# Patient Record
Sex: FEMALE | Race: BLACK OR AFRICAN AMERICAN | Employment: FULL TIME | URBAN - METROPOLITAN AREA
[De-identification: names, ages, dates, MRNs, and addresses within clinical notes are randomized per-mention and may not be internally consistent; named-entity substitution may affect disease eponyms.]

---

## 2017-04-28 ENCOUNTER — HOSPITAL ENCOUNTER (EMERGENCY)
Age: 27
Discharge: HOME | End: 2017-04-28
Attending: EMERGENCY MEDICINE
Payer: SELF-PAY

## 2017-04-28 ENCOUNTER — APPOINTMENT (OUTPATIENT)
Dept: ULTRASOUND IMAGING | Age: 27
End: 2017-04-28
Attending: PHYSICIAN ASSISTANT
Payer: SELF-PAY

## 2017-04-28 VITALS
RESPIRATION RATE: 16 BRPM | TEMPERATURE: 98 F | WEIGHT: 220 LBS | SYSTOLIC BLOOD PRESSURE: 120 MMHG | OXYGEN SATURATION: 99 % | HEART RATE: 78 BPM | DIASTOLIC BLOOD PRESSURE: 74 MMHG

## 2017-04-28 DIAGNOSIS — O20.0 THREATENED ABORTION: Primary | ICD-10-CM

## 2017-04-28 LAB
ABO + RH BLD: NORMAL
APPEARANCE UR: ABNORMAL
BACTERIA URNS QL MICRO: NEGATIVE /HPF
BASOPHILS # BLD AUTO: 0 K/UL (ref 0–0.1)
BASOPHILS # BLD: 0 % (ref 0–1)
BILIRUB UR QL: NEGATIVE
BLOOD BANK CMNT PATIENT-IMP: NORMAL
CLUE CELLS VAG QL WET PREP: NORMAL
COLOR UR: ABNORMAL
EOSINOPHIL # BLD: 0.1 K/UL (ref 0–0.4)
EOSINOPHIL NFR BLD: 1 % (ref 0–7)
EPITH CASTS URNS QL MICRO: ABNORMAL /LPF
ERYTHROCYTE [DISTWIDTH] IN BLOOD BY AUTOMATED COUNT: 14.6 % (ref 11.5–14.5)
GLUCOSE UR STRIP.AUTO-MCNC: NEGATIVE MG/DL
HCG SERPL-ACNC: ABNORMAL MIU/ML (ref 0–6)
HCT VFR BLD AUTO: 34.2 % (ref 35–47)
HGB BLD-MCNC: 10.9 G/DL (ref 11.5–16)
HGB UR QL STRIP: NEGATIVE
HYALINE CASTS URNS QL MICRO: ABNORMAL /LPF (ref 0–5)
KETONES UR QL STRIP.AUTO: NEGATIVE MG/DL
KOH PREP SPEC: NORMAL
LEUKOCYTE ESTERASE UR QL STRIP.AUTO: ABNORMAL
LYMPHOCYTES # BLD AUTO: 16 % (ref 12–49)
LYMPHOCYTES # BLD: 2.1 K/UL (ref 0.8–3.5)
MCH RBC QN AUTO: 25.1 PG (ref 26–34)
MCHC RBC AUTO-ENTMCNC: 31.9 G/DL (ref 30–36.5)
MCV RBC AUTO: 78.6 FL (ref 80–99)
MONOCYTES # BLD: 0.8 K/UL (ref 0–1)
MONOCYTES NFR BLD AUTO: 6 % (ref 5–13)
MUCOUS THREADS URNS QL MICRO: ABNORMAL /LPF
NEUTS SEG # BLD: 10.3 K/UL (ref 1.8–8)
NEUTS SEG NFR BLD AUTO: 77 % (ref 32–75)
NITRITE UR QL STRIP.AUTO: NEGATIVE
PH UR STRIP: 6 [PH] (ref 5–8)
PLATELET # BLD AUTO: 322 K/UL (ref 150–400)
PROT UR STRIP-MCNC: ABNORMAL MG/DL
RBC # BLD AUTO: 4.35 M/UL (ref 3.8–5.2)
RBC #/AREA URNS HPF: ABNORMAL /HPF (ref 0–5)
SERVICE CMNT-IMP: NORMAL
SP GR UR REFRACTOMETRY: >1.03 (ref 1–1.03)
T VAGINALIS VAG QL WET PREP: NORMAL
UA: UC IF INDICATED,UAUC: ABNORMAL
UROBILINOGEN UR QL STRIP.AUTO: 1 EU/DL (ref 0.2–1)
WBC # BLD AUTO: 13.4 K/UL (ref 3.6–11)
WBC URNS QL MICRO: ABNORMAL /HPF (ref 0–4)

## 2017-04-28 PROCEDURE — 86900 BLOOD TYPING SEROLOGIC ABO: CPT | Performed by: PHYSICIAN ASSISTANT

## 2017-04-28 PROCEDURE — 87210 SMEAR WET MOUNT SALINE/INK: CPT | Performed by: PHYSICIAN ASSISTANT

## 2017-04-28 PROCEDURE — 85025 COMPLETE CBC W/AUTO DIFF WBC: CPT | Performed by: PHYSICIAN ASSISTANT

## 2017-04-28 PROCEDURE — 87491 CHLMYD TRACH DNA AMP PROBE: CPT | Performed by: PHYSICIAN ASSISTANT

## 2017-04-28 PROCEDURE — 76801 OB US < 14 WKS SINGLE FETUS: CPT

## 2017-04-28 PROCEDURE — 36415 COLL VENOUS BLD VENIPUNCTURE: CPT | Performed by: PHYSICIAN ASSISTANT

## 2017-04-28 PROCEDURE — 99284 EMERGENCY DEPT VISIT MOD MDM: CPT

## 2017-04-28 PROCEDURE — 84702 CHORIONIC GONADOTROPIN TEST: CPT | Performed by: PHYSICIAN ASSISTANT

## 2017-04-28 PROCEDURE — 87086 URINE CULTURE/COLONY COUNT: CPT | Performed by: EMERGENCY MEDICINE

## 2017-04-28 PROCEDURE — 81001 URINALYSIS AUTO W/SCOPE: CPT | Performed by: PHYSICIAN ASSISTANT

## 2017-04-28 PROCEDURE — 76817 TRANSVAGINAL US OBSTETRIC: CPT

## 2017-04-28 RX ORDER — METRONIDAZOLE 500 MG/1
500 TABLET ORAL 2 TIMES DAILY
Qty: 14 TAB | Refills: 0 | Status: SHIPPED | OUTPATIENT
Start: 2017-04-28 | End: 2017-05-05

## 2017-04-28 NOTE — LETTER
[address] 
[address] [facility] 7 [id number] 
[phone] Work/School Note Date: 4/28/2017 To Whom It May concern: 
 
Galo Mcmullen was seen and treated today in the emergency room by the following provider(s): 
Attending Provider: Trisha Cote MD 
Physician Assistant: Af[place], [place]. Galo Mcmullen may return to work on 5/1/17. Sincerely, Bandar Eagle RN

## 2017-04-29 NOTE — ED PROVIDER NOTES
HPI Comments: 24 y.o. female with past medical history significant for c-sections who presents from home with chief complaint of vaginal bleeding. Pt complains of vaginal bleeding that started earlier this morning and intermittent cramping pelvic pain. Pt notes that she is eight weeks pregnant and has been vomiting daily (3x today). Pt also complains of dizziness and lightheadedness when she is vomiting. Pt states that she has had two previous pregnancies without any complications. Pt also complains of constipation. Pt states that she had cold-like symptoms earlier that went away a week ago. Pt denies any chest pain, SOB or dysuria. There are no other acute medical concerns at this time. Note written by Fortunato Nix, as dictated by [facility] 9:45 PM          The history is provided by the patient. No  was used. History reviewed. No pertinent past medical history. Past Surgical History:   Procedure Laterality Date    HX  SECTION      x 2         History reviewed. No pertinent family history. Social History     Social History    Marital status: SINGLE     Spouse name: N/A    Number of children: N/A    Years of education: N/A     Occupational History    Not on file. Social History Main Topics    Smoking status: Current Every Day Smoker    Smokeless tobacco: Not on file    Alcohol use No    Drug use: Not on file    Sexual activity: Not on file     Other Topics Concern    Not on file     Social History Narrative    No narrative on file         ALLERGIES: Review of patient's allergies indicates no known allergies. Review of Systems   Constitutional: Negative. Negative for chills, fatigue and fever. HENT: Negative. Negative for congestion, ear pain, facial swelling, rhinorrhea, sneezing and sore throat. Eyes: Negative for pain, discharge and itching. Respiratory: Negative for cough, chest tightness and shortness of breath.     Cardiovascular: Negative. Negative for chest pain and leg swelling. Gastrointestinal: Positive for constipation and vomiting. Negative for abdominal distention, abdominal pain and diarrhea. Genitourinary: Positive for pelvic pain and vaginal bleeding. Negative for difficulty urinating, dysuria, frequency and urgency. Musculoskeletal: Negative for arthralgias, back pain, joint swelling, neck pain and neck stiffness. Skin: Negative for color change and rash. Neurological: Positive for dizziness. Negative for numbness and headaches. Psychiatric/Behavioral: Negative for confusion and decreased concentration. All other systems reviewed and are negative. Vitals:    04/28/17 2048   BP: 129/74   Pulse: 82   Resp: 17   Temp: 98.8 °F (37.1 °C)   SpO2: 98%   Weight: 99.8 kg (220 lb)            Physical Exam   Constitutional: She is oriented to person, place, and time. She appears well-developed and well-nourished. No distress. Well appearing AAF seated in NAD   HENT:   Head: Normocephalic and atraumatic. Right Ear: External ear normal.   Left Ear: External ear normal.   Nose: Nose normal.   Mouth/Throat: Oropharynx is clear and moist. No oropharyngeal exudate. Eyes: Conjunctivae and EOM are normal. Pupils are equal, round, and reactive to light. Right eye exhibits no discharge. Left eye exhibits no discharge. No scleral icterus. Neck: Normal range of motion. Neck supple. Cardiovascular: Normal rate and regular rhythm. Exam reveals no gallop and no friction rub. No murmur heard. Pulmonary/Chest: Effort normal and breath sounds normal. She has no wheezes. She has no rales. Abdominal: Soft. Bowel sounds are normal. She exhibits no distension. There is no tenderness. There is no rebound and no guarding. Genitourinary: Cervix exhibits discharge (scant amount light brown). Cervix exhibits no motion tenderness and no friability. Right adnexum displays no tenderness. Left adnexum displays no tenderness.  No erythema, tenderness or bleeding in the vagina. No foreign body in the vagina. No signs of injury around the vagina. No vaginal discharge found. Neurological: She is alert and oriented to person, place, and time. No cranial nerve deficit. Coordination normal.   Skin: Skin is warm and dry. She is not diaphoretic. Psychiatric: She has a normal mood and affect. Her behavior is normal.   Nursing note and vitals reviewed. MDM  Number of Diagnoses or Management Options  Threatened :   Diagnosis management comments: 25 yo , ~ 8 week pregnant per patient w/ lower abdominal pain and vaginal bleeding. Appears hemodynamically stable. No active bleeding on exam. ? Threatened AB ? UTI amongst others    Plan  CBC, CMP, HCG qt, Type ABO, pelvic US and reassess. Norma Magaña         Amount and/or Complexity of Data Reviewed  Clinical lab tests: ordered and reviewed  Tests in the radiology section of CPT®: ordered and reviewed  Independent visualization of images, tracings, or specimens: yes      ED Course       Procedures             Progress note    H and H stable. RH +, Pelvic US 11 week IUP.  +BV. Norma Magaña     Patient's results have been reviewed with them. Patient and/or family have verbally conveyed their understanding and agreement of the patient's signs, symptoms, diagnosis, treatment and prognosis and additionally agree to follow up as recommended or return to the Emergency Room should their condition change prior to follow-up. Discharge instructions have also been provided to the patient with some educational information regarding their diagnosis as well a list of reasons why they would want to return to the ER prior to their follow-up appointment should their condition change. Norma Magaña    A. DAYO  Threatened AB: Follow-up with OB. MAGDA Magaña    BV: Metronidazole twice daily for the next 7 days. Return for any new or worsening.  Afua Finney PA          a

## 2017-04-29 NOTE — DISCHARGE INSTRUCTIONS
We hope that we have addressed all of your medical concerns. The examination and treatment you received in the Emergency Department were for an emergent problem and were not intended as complete care. It is important that you follow up with your healthcare provider(s) for ongoing care. If your symptoms worsen or do not improve as expected, and you are unable to reach your usual health care provider(s), you should return to the Emergency Department. Today's healthcare is undergoing tremendous change, and patient satisfaction surveys are one of the many tools to assess the quality of medical care. You may receive a survey from the [employer] regarding your experience in the Emergency Department. I hope that your experience has been completely positive, particularly the medical care that I provided. As such, please participate in the survey; anything less than excellent does not meet my expectations or intentions. [address] [facility] and [address] [place] [address] participate in nationally recognized quality of care measures. If your blood pressure is greater than 120/80, as reported below, we urge that you seek medical care to address the potential of high blood pressure, commonly known as hypertension. Hypertension can be hereditary or can be caused by certain medical conditions, pain, stress, or \"white coat syndrome. \"       Please make an appointment with your health care provider(s) for follow up of your Emergency Department visit. VITALS:   Patient Vitals for the past 8 hrs:   Temp Pulse Resp BP SpO2   04/28/17 2048 98.8 °F (37.1 °C) 82 17 129/74 98 %          Thank you for allowing us to provide you with medical care today. We realize that you have many choices for your emergency care needs. Please choose us in the future for any continued health care needs. Regards,           April C.  Sharmin, [address] [facility] [address] Office: [phone]            Recent Results (from the past 24 hour(s))   TYPE, ABO & RH    Collection Time: 04/28/17  9:43 PM   Result Value Ref Range    ABO/Rh(D) O POSITIVE     Comment SAMPLE NOT USABLE FOR CROSSMATCH    URINALYSIS W/ REFLEX CULTURE    Collection Time: 04/28/17  9:43 PM   Result Value Ref Range    Color YELLOW/STRAW      Appearance CLOUDY (A) CLEAR      Specific gravity >1.030 (H) 1.003 - 1.030    pH (UA) 6.0 5.0 - 8.0      Protein TRACE (A) NEG mg/dL    Glucose NEGATIVE  NEG mg/dL    Ketone NEGATIVE  NEG mg/dL    Bilirubin NEGATIVE  NEG      Blood NEGATIVE  NEG      Urobilinogen 1.0 0.2 - 1.0 EU/dL    Nitrites NEGATIVE  NEG      Leukocyte Esterase SMALL (A) NEG      WBC 5-10 0 - 4 /hpf    RBC 0-5 0 - 5 /hpf    Epithelial cells FEW FEW /lpf    Bacteria NEGATIVE  NEG /hpf    UA:UC IF INDICATED URINE CULTURE ORDERED (A) CNI      Mucus 1+ (A) NEG /lpf    Hyaline cast 2-5 0 - 5 /lpf   TOTAL HCG, QT. Collection Time: 04/28/17  9:43 PM   Result Value Ref [place]    [place] [address] (H) 0 - 6 MIU/ML   WET PREP    Collection Time: 04/28/17  9:43 PM   Result Value Ref Range    Clue cells CLUE CELLS PRESENT      Wet prep NO TRICHOMONAS SEEN     KOH, OTHER SOURCES    Collection Time: 04/28/17  9:43 PM   Result Value Ref Range    Special Requests: NO SPECIAL REQUESTS      KOH NO YEAST SEEN     CBC WITH AUTOMATED DIFF    Collection Time: 04/28/17  9:44 PM   Result Value Ref Range    WBC 13.4 (H) 3.6 - 11.0 K/uL    RBC 4.35 3.80 - 5.20 M/uL    HGB 10.9 (L) 11.5 - 16.0 g/dL    HCT 34.2 (L) 35.0 - 47.0 %    MCV 78.6 (L) 80.0 - 99.0 FL    MCH 25.1 (L) 26.0 - 34.0 PG    MCHC 31.9 30.0 - 36.5 g/dL    RDW 14.6 (H) 11.5 - 14.5 %    PLATELET 705 984 - 136 K/uL    NEUTROPHILS 77 (H) 32 - 75 %    LYMPHOCYTES 16 12 - 49 %    MONOCYTES 6 5 - 13 %    EOSINOPHILS 1 0 - 7 %    BASOPHILS 0 0 - 1 %    ABS. NEUTROPHILS 10.3 (H) 1.8 - 8.0 K/UL    ABS. LYMPHOCYTES 2.1 0.8 - 3.5 K/UL    ABS.  MONOCYTES 0.8 0.0 - 1.0 K/UL ABS. EOSINOPHILS 0.1 0.0 - 0.4 K/UL    ABS. BASOPHILS 0.0 0.0 - 0.1 K/UL       Us Uts Transvaginal Ob    Result Date: 4/28/2017  EXAM:  US PREG UTS < 14 WKS SNGL, US UTS TRANSVAGINAL OB INDICATION:   vaginal bleeding and cramping for one day. Positive pregnancy. COMPARISON: None. TECHNIQUE: Realtime sonography of the pelvis was performed transabdominally with multiple static images obtained. Transvaginal evaluation was also performed to better evaluate the fetus. FINDINGS: Transabdominal: The uterus is retroflexed and measures 12 x 7.4 x 8.3 cm. The examination demonstrates a single intrauterine pregnancy with a crown-rump length of 4.3 cm and estimated gestational age of 5 weeks 1 day. Fetal cardiac activity is identified with a fetal heart rate of 168 beats per minute. The placenta is not seen at this age. There is adequate amniotic fluid. The right ovary is not seen. The left ovary is normal in appearance and measures 4.1 x 3.9 x 3.0 cm. . Transvaginal: The uterus is retroflexed and measures 11.6 x 8.9 x 7.0 cm. The examination demonstrates a single intrauterine pregnancy with a crown-rump length of 4.2 cm and estimated gestational age of 5 weeks 1 day. Fetal cardiac activity is identified with a fetal heart rate of 171 beats per minute. The placenta is not seen at this age. There is adequate amniotic fluid. The ovaries are normal in appearance. The right ovary measures 3.4 x 2.3 x 1.7 cm and the left ovary measures 5.1 x 2.1 x 3.1 cm. There is normal flow in both ovaries. IMPRESSION: Single live 11 week 1 day intrauterine pregnancy. Us Preg Uts < 14 Wks Sngl    Result Date: 4/28/2017  EXAM:  US PREG UTS < 14 WKS SNGL, US UTS TRANSVAGINAL OB INDICATION:   vaginal bleeding and cramping for one day. Positive pregnancy. COMPARISON: None. TECHNIQUE: Realtime sonography of the pelvis was performed transabdominally with multiple static images obtained.  Transvaginal evaluation was also performed to better evaluate the fetus. FINDINGS: Transabdominal: The uterus is retroflexed and measures 12 x 7.4 x 8.3 cm. The examination demonstrates a single intrauterine pregnancy with a crown-rump length of 4.3 cm and estimated gestational age of 5 weeks 1 day. Fetal cardiac activity is identified with a fetal heart rate of 168 beats per minute. The placenta is not seen at this age. There is adequate amniotic fluid. The right ovary is not seen. The left ovary is normal in appearance and measures 4.1 x 3.9 x 3.0 cm. . Transvaginal: The uterus is retroflexed and measures 11.6 x 8.9 x 7.0 cm. The examination demonstrates a single intrauterine pregnancy with a crown-rump length of 4.2 cm and estimated gestational age of 5 weeks 1 day. Fetal cardiac activity is identified with a fetal heart rate of 171 beats per minute. The placenta is not seen at this age. There is adequate amniotic fluid. The ovaries are normal in appearance. The right ovary measures 3.4 x 2.3 x 1.7 cm and the left ovary measures 5.1 x 2.1 x 3.1 cm. There is normal flow in both ovaries. IMPRESSION: Single live 11 week 1 day intrauterine pregnancy. Threatened Miscarriage: Care Instructions  Your Care Instructions    Some women have light spotting or bleeding during the first 12 weeks of pregnancy. In some cases this is normal. Light spotting or bleeding can also be a sign of a possible loss of the pregnancy. This is called a threatened miscarriage. At this point, the doctor may not be able to tell if your vaginal bleeding is normal or is a sign of a miscarriage. In early pregnancy, things such as stress, exercise, and sex do not cause miscarriage. You may be worried or upset about the possibility of losing your pregnancy. But do not blame yourself. There is no treatment to stop a threatened miscarriage. If you do have a miscarriage, there was nothing you could have done to prevent it.  A miscarriage usually means that the pregnancy is not developing normally. The doctor has checked you carefully, but problems can develop later. If you notice any problems or new symptoms, get medical treatment right away. Follow-up care is a key part of your treatment and safety. Be sure to make and go to all appointments, and call your doctor if you are having problems. It's also a good idea to know your test results and keep a list of the medicines you take. How can you care for yourself at home? · If you do have a miscarriage, you will probably have some vaginal bleeding for 1 to 2 weeks. Use pads instead of tampons. · Take acetaminophen (Tylenol) for cramps. Read and follow all instructions on the label. · Do not take two or more pain medicines at the same time unless the doctor told you to. Many pain medicines have acetaminophen, which is Tylenol. Too much acetaminophen (Tylenol) can be harmful. · Do not have sex until your doctor says it is okay. · Get lots of rest over the next several days. · You may do your normal activities if you feel well enough to do them. But do not do any heavy exercise until your doctor says it is okay. · Eat a balanced diet that is high in iron and vitamin C. Foods rich in iron include red meat, shellfish, eggs, beans, and leafy green vegetables. Foods high in vitamin C include citrus fruits, tomatoes, and broccoli. Talk to your doctor about whether you need to take iron pills or a multivitamin. · Do not drink alcohol or use tobacco or illegal drugs. · Do not smoke. If you need help quitting, talk to your doctor about stop-smoking programs and medicines. These can increase your chances of quitting for good. When should you call for help? Call 911 anytime you think you may need emergency care. For example, call if:  · You have sudden, severe pain in your belly or pelvis. · You passed out (lost consciousness). · You have severe vaginal bleeding.   Call your doctor now or seek immediate medical care if:  · You are dizzy or lightheaded, or you feel like you may faint. · You have new or increased pain in your belly or pelvis. · Your vaginal bleeding is getting worse. · You have increased pain in the vaginal area. · You have a fever. · You think you may have passed tissue. Save any tissue that you pass. Take it to your doctor's office as soon as you can. Watch closely for changes in your health, and be sure to contact your doctor if:  · You have new or worse vaginal discharge. · You do not get better as expected. Where can you learn more? Go to http://mell-sulaiman.info/. Enter M109 in the search box to learn more about \"Threatened Miscarriage: Care Instructions. \"  Current as of: May 30, 2016  Content Version: 11.2  © 6263-8061 [employer]. Care instructions adapted under license by [employer] (which disclaims liability or warranty for this information). If you have questions about a medical condition or this instruction, always ask your healthcare professional. [address] any warranty or liability for your use of this information.

## 2017-04-29 NOTE — ED TRIAGE NOTES
Triage: Patient arrives ambulatory from home with c/o vaginal bleeding earlier today. Patient reports she was seen at health department and had her pregnancy confirmed, states she'd be about 8 weeks today. Patient reports she is no longer bleeding but is cramping and passed some clots this morning.

## 2017-04-30 LAB
BACTERIA SPEC CULT: NORMAL
CC UR VC: NORMAL
SERVICE CMNT-IMP: NORMAL

## 2017-05-01 LAB
C TRACH DNA SPEC QL NAA+PROBE: NEGATIVE
N GONORRHOEA DNA SPEC QL NAA+PROBE: NEGATIVE
SAMPLE TYPE: NORMAL
SERVICE CMNT-IMP: NORMAL
SPECIMEN SOURCE: NORMAL

## 2021-12-06 ENCOUNTER — HOSPITAL ENCOUNTER (EMERGENCY)
Age: 31
Discharge: HOME | End: 2021-12-06
Attending: EMERGENCY MEDICINE
Payer: MEDICAID

## 2021-12-06 ENCOUNTER — APPOINTMENT (OUTPATIENT)
Dept: GENERAL RADIOLOGY | Age: 31
End: 2021-12-06
Attending: NURSE PRACTITIONER
Payer: MEDICAID

## 2021-12-06 VITALS
DIASTOLIC BLOOD PRESSURE: 109 MMHG | HEART RATE: 67 BPM | OXYGEN SATURATION: 100 % | TEMPERATURE: 97.4 F | SYSTOLIC BLOOD PRESSURE: 149 MMHG | RESPIRATION RATE: 16 BRPM

## 2021-12-06 DIAGNOSIS — M54.9 ACUTE BACK PAIN, UNSPECIFIED BACK LOCATION, UNSPECIFIED BACK PAIN LATERALITY: ICD-10-CM

## 2021-12-06 DIAGNOSIS — M53.3 COCCYX PAIN: ICD-10-CM
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
## 2021-12-06 PROCEDURE — 74011250637 HC RX REV CODE- 250/637: Performed by: NURSE PRACTITIONER

## 2021-12-06 PROCEDURE — 99283 EMERGENCY DEPT VISIT LOW MDM: CPT

## 2021-12-06 PROCEDURE — 72100 X-RAY EXAM L-S SPINE 2/3 VWS: CPT

## 2021-12-06 PROCEDURE — 73080 X-RAY EXAM OF ELBOW: CPT

## 2021-12-06 PROCEDURE — 72220 X-RAY EXAM SACRUM TAILBONE: CPT

## 2021-12-06 PROCEDURE — 72050 X-RAY EXAM NECK SPINE 4/5VWS: CPT

## 2021-12-06 RX ORDER — TRAMADOL HYDROCHLORIDE 50 MG/1
50 TABLET ORAL
Status: COMPLETED | OUTPATIENT
Start: 2021-12-06 | End: 2021-12-06

## 2021-12-06 RX ORDER — IBUPROFEN 800 MG/1
800 TABLET ORAL
Qty: 20 TABLET | Refills: 0 | Status: SHIPPED | OUTPATIENT
Start: 2021-12-06 | End: 2021-12-13

## 2021-12-06 RX ORDER — IBUPROFEN 400 MG/1
800 TABLET ORAL
Status: COMPLETED | OUTPATIENT
Start: 2021-12-06 | End: 2021-12-06

## 2021-12-06 RX ADMIN — TRAMADOL HYDROCHLORIDE 50 MG: 50 TABLET, COATED ORAL at 19:03

## 2021-12-06 RX ADMIN — IBUPROFEN 800 MG: 400 TABLET, FILM COATED ORAL at 19:03
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
## 2021-12-06 NOTE — Clinical Note
[address]  [address] [facility] [address]  [phone]    Work/School Note    Date: 12/6/2021    To Whom It May concern:    Carmita Farrar was seen and treated today in the emergency room by the following provider(s):  Attending Provider: Genevieve Laboy MD  Nurse Practitioner: Era Oneill NP. Carmita Farrar is excused from work/school on 12/6/2021 through 12/8/2021. She is medically clear to return to work/school on 12/9/2021.          Sincerely,          Aye Martinez NP
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
## 2021-12-06 NOTE — Clinical Note
[address]  [address] [facility] [address]  [phone]    Work/School Note    Date: 12/6/2021    To Whom It May concern:    Samuel Jenkins was seen and treated today in the emergency room by the following provider(s):  Attending Provider: Gordon Hernandez MD  Nurse Practitioner: Barb Caballero NP. Samuel Jenkins is excused from work/school on 12/6/2021 through 12/8/2021. She is medically clear to return to work/school on 12/9/2021.          Sincerely,          Trupti Blount NP

## 2021-12-07 NOTE — DISCHARGE INSTRUCTIONS
Take medications as directed; you can rotate Tylenol and Motrin (as directed) with flexeril to help with pain   No evidence of fracture on your x-rays today   Return to the ER for any worsening symptoms; follow up with your primary care provider   [facility] Departments     For adult and child immunizations, family planning, TB screening, STD testing and women's health services. [facility]: [place] [phone]      [facility] 25   657 [facility]   [address]   170 [facility] [place] 200 [facility] [phone]      2400 [facility]          [address] [address]     For primary care services, woman and child wellness, and some clinics providing specialty care. [facility] -- [facility]1 [facility]. [address] [place] [address] [phone]   [address] [place] [address] 200 [facility] [address] [place] [address] [phone]   [address] [facility] [address] [place] [address] [phone]   [address]  [place] [address] [place] [address] 5850 [facility]  [phone]   [address] [place] [address] [address] [place] [phone]   [address] [phone]   Deandre Barrera [facility] [address] [place] [address] [phone]   [facility]: [facility] 700 tiff[address] Jimenez 79 [facility] [address] [phone]     [address] [phone]   [facility] Out[facility] 5850  Community  [phone]   [facility]  [address], [place] [address] ([email or web address]) [phone]         Sexual Health/Woman Wellness Clinics    For STD/HIV testing and treatment, pregnancy testing and services, men's health, birth control services, LGBT services, and hepatitis/HPV vaccine services. [employer] [employer] [facility] [place] [employer] 201 [address]. [facility] 75 [facility] 943 600 [facility]  [phone]   [address] [place] [address] Rd, 5th floor [phone]   Pregnancy [address] [facility] [address] for Women [address]  [place] [phone]         [place] [address] [facility] [address] [place] [address]   [phone]   [place]   [phone]   Women, Infant and Children's Services: [address] [phone]       600 [facility]   [place] [facility]   [phone]   [facility]   [phone]   [facility] Psychiatry     [phone]   [address] Crisis   1212 [facility] [phone]       Local Primary Care Physicians  [facility] [phone]  MD Cira Haley MD Ronelle Hammers, MD [facility] Community Doctors [phone]  Kyrie Romero, MAURICIOP  MD Saurabh Koehler MD Genevia Coffee, MD [address] [address] [phone]  MD Lazarus Vigil MD [address] [facility] [phone]  MD Alicia Dominguez MD Ulla Media, MD Kellie Sinclair, MD   [facility] [phone]  [facility] ANMEBDOTTIE LORENZO, MD Roland Roblero MD  [facility], NP [address] [phone]  MD Alexa Bradley MD Lyndel Ream, MD Shepard Ferris, MD Rock Mattes, MD Susanna Fritter, MD Brunetta Penton, MD   [facility] [address] [phone]  Мария Catherine MD [address] [facility] [phone]  MD Vick Lopez, NP  MD Placido Junior MD Derwin Grip, MD Sean Minerva, MD Bennet Haley, MD   0845 [facility] [place] [facility] [phone]  MD Geovanny Davis, CHYNA Caldera, NP  MD Carmel Lucio, MD Sylwia Young, MD Avery Prince MD 2905 [facility] [phone]  Brodie Homans, MD Zettie Guy, MD Abelardo Hyman, MD Bubba Chappell, MD Antolin Maxwell MD   [facility] [phone]  MD Neda Dai, MD [place] [phone]  MD Milton Barrientos, MD Marian Aquino, MD   [facility] [phone]  Uzair Ling, MD Heydi Hearn, MD Del Vergara, MD Kaylynn Feldman, MD Cuevas, MD Oscar Ramirez, NP  Constantin Cabrales MD [address]   [phone]  Jamarcus Santos, MD Nohemy Donahue, MD Nalani Aase, MD   3966 [facility] [phone]  MD Sarkis Moseley, FNP  Ok Smith, PARL Smith, FNP  Moreno Dueñas, MD Ad Rucker, NP   Michael Alegre, DO Miscellaneous:  Ludwig Harada, [phone]